# Patient Record
Sex: MALE | Race: WHITE | NOT HISPANIC OR LATINO | URBAN - METROPOLITAN AREA
[De-identification: names, ages, dates, MRNs, and addresses within clinical notes are randomized per-mention and may not be internally consistent; named-entity substitution may affect disease eponyms.]

---

## 2022-09-02 ENCOUNTER — INPATIENT (INPATIENT)
Facility: HOSPITAL | Age: 37
LOS: 0 days | Discharge: HOME | DRG: 310 | End: 2022-09-03
Attending: INTERNAL MEDICINE | Admitting: INTERNAL MEDICINE
Payer: COMMERCIAL

## 2022-09-02 VITALS
SYSTOLIC BLOOD PRESSURE: 128 MMHG | OXYGEN SATURATION: 100 % | TEMPERATURE: 97 F | DIASTOLIC BLOOD PRESSURE: 56 MMHG | RESPIRATION RATE: 20 BRPM | WEIGHT: 169.98 LBS | HEART RATE: 152 BPM

## 2022-09-02 LAB
ALBUMIN SERPL ELPH-MCNC: 4.6 G/DL — SIGNIFICANT CHANGE UP (ref 3.4–5)
ALP SERPL-CCNC: 68 U/L — SIGNIFICANT CHANGE UP (ref 40–120)
ALT FLD-CCNC: 23 U/L — SIGNIFICANT CHANGE UP (ref 12–42)
AMPHET UR-MCNC: NEGATIVE — SIGNIFICANT CHANGE UP
ANION GAP SERPL CALC-SCNC: 10 MMOL/L — SIGNIFICANT CHANGE UP (ref 9–16)
APPEARANCE UR: CLEAR — SIGNIFICANT CHANGE UP
AST SERPL-CCNC: 18 U/L — SIGNIFICANT CHANGE UP (ref 15–37)
BARBITURATES UR SCN-MCNC: NEGATIVE — SIGNIFICANT CHANGE UP
BASOPHILS # BLD AUTO: 0.04 K/UL — SIGNIFICANT CHANGE UP (ref 0–0.2)
BASOPHILS NFR BLD AUTO: 0.6 % — SIGNIFICANT CHANGE UP (ref 0–2)
BENZODIAZ UR-MCNC: NEGATIVE — SIGNIFICANT CHANGE UP
BILIRUB SERPL-MCNC: 0.5 MG/DL — SIGNIFICANT CHANGE UP (ref 0.2–1.2)
BILIRUB UR-MCNC: NEGATIVE — SIGNIFICANT CHANGE UP
BUN SERPL-MCNC: 18 MG/DL — SIGNIFICANT CHANGE UP (ref 7–23)
CALCIUM SERPL-MCNC: 9.3 MG/DL — SIGNIFICANT CHANGE UP (ref 8.5–10.5)
CHLORIDE SERPL-SCNC: 103 MMOL/L — SIGNIFICANT CHANGE UP (ref 96–108)
CO2 SERPL-SCNC: 29 MMOL/L — SIGNIFICANT CHANGE UP (ref 22–31)
COCAINE METAB.OTHER UR-MCNC: NEGATIVE — SIGNIFICANT CHANGE UP
COLOR SPEC: YELLOW — SIGNIFICANT CHANGE UP
CREAT SERPL-MCNC: 1.08 MG/DL — SIGNIFICANT CHANGE UP (ref 0.5–1.3)
DIFF PNL FLD: NEGATIVE — SIGNIFICANT CHANGE UP
EGFR: 90 ML/MIN/1.73M2 — SIGNIFICANT CHANGE UP
EOSINOPHIL # BLD AUTO: 0.01 K/UL — SIGNIFICANT CHANGE UP (ref 0–0.5)
EOSINOPHIL NFR BLD AUTO: 0.1 % — SIGNIFICANT CHANGE UP (ref 0–6)
FLUAV AG NPH QL: SIGNIFICANT CHANGE UP
FLUBV AG NPH QL: SIGNIFICANT CHANGE UP
GLUCOSE SERPL-MCNC: 121 MG/DL — HIGH (ref 70–99)
GLUCOSE UR QL: NEGATIVE — SIGNIFICANT CHANGE UP
HCT VFR BLD CALC: 48 % — SIGNIFICANT CHANGE UP (ref 39–50)
HGB BLD-MCNC: 16.1 G/DL — SIGNIFICANT CHANGE UP (ref 13–17)
IMM GRANULOCYTES NFR BLD AUTO: 0.3 % — SIGNIFICANT CHANGE UP (ref 0–1.5)
KETONES UR-MCNC: ABNORMAL MG/DL
LEUKOCYTE ESTERASE UR-ACNC: NEGATIVE — SIGNIFICANT CHANGE UP
LYMPHOCYTES # BLD AUTO: 1.76 K/UL — SIGNIFICANT CHANGE UP (ref 1–3.3)
LYMPHOCYTES # BLD AUTO: 24.5 % — SIGNIFICANT CHANGE UP (ref 13–44)
MAGNESIUM SERPL-MCNC: 1.8 MG/DL — SIGNIFICANT CHANGE UP (ref 1.6–2.6)
MCHC RBC-ENTMCNC: 29.9 PG — SIGNIFICANT CHANGE UP (ref 27–34)
MCHC RBC-ENTMCNC: 33.5 GM/DL — SIGNIFICANT CHANGE UP (ref 32–36)
MCV RBC AUTO: 89.1 FL — SIGNIFICANT CHANGE UP (ref 80–100)
METHADONE UR-MCNC: NEGATIVE — SIGNIFICANT CHANGE UP
MONOCYTES # BLD AUTO: 0.44 K/UL — SIGNIFICANT CHANGE UP (ref 0–0.9)
MONOCYTES NFR BLD AUTO: 6.1 % — SIGNIFICANT CHANGE UP (ref 2–14)
NEUTROPHILS # BLD AUTO: 4.9 K/UL — SIGNIFICANT CHANGE UP (ref 1.8–7.4)
NEUTROPHILS NFR BLD AUTO: 68.4 % — SIGNIFICANT CHANGE UP (ref 43–77)
NITRITE UR-MCNC: NEGATIVE — SIGNIFICANT CHANGE UP
NRBC # BLD: 0 /100 WBCS — SIGNIFICANT CHANGE UP (ref 0–0)
NT-PROBNP SERPL-SCNC: 15 PG/ML — SIGNIFICANT CHANGE UP
OPIATES UR-MCNC: NEGATIVE — SIGNIFICANT CHANGE UP
PCP SPEC-MCNC: SIGNIFICANT CHANGE UP
PCP UR-MCNC: NEGATIVE — SIGNIFICANT CHANGE UP
PH UR: 6 — SIGNIFICANT CHANGE UP (ref 5–8)
PLATELET # BLD AUTO: 217 K/UL — SIGNIFICANT CHANGE UP (ref 150–400)
POTASSIUM SERPL-MCNC: 4 MMOL/L — SIGNIFICANT CHANGE UP (ref 3.5–5.3)
POTASSIUM SERPL-SCNC: 4 MMOL/L — SIGNIFICANT CHANGE UP (ref 3.5–5.3)
PROT SERPL-MCNC: 7.7 G/DL — SIGNIFICANT CHANGE UP (ref 6.4–8.2)
PROT UR-MCNC: NEGATIVE MG/DL — SIGNIFICANT CHANGE UP
RBC # BLD: 5.39 M/UL — SIGNIFICANT CHANGE UP (ref 4.2–5.8)
RBC # FLD: 12.7 % — SIGNIFICANT CHANGE UP (ref 10.3–14.5)
RSV RNA NPH QL NAA+NON-PROBE: SIGNIFICANT CHANGE UP
SARS-COV-2 RNA SPEC QL NAA+PROBE: SIGNIFICANT CHANGE UP
SODIUM SERPL-SCNC: 142 MMOL/L — SIGNIFICANT CHANGE UP (ref 132–145)
SP GR SPEC: 1.02 — SIGNIFICANT CHANGE UP (ref 1–1.03)
THC UR QL: NEGATIVE — SIGNIFICANT CHANGE UP
TROPONIN I, HIGH SENSITIVITY RESULT: 5.8 NG/L — SIGNIFICANT CHANGE UP
UROBILINOGEN FLD QL: 1 E.U./DL — SIGNIFICANT CHANGE UP
WBC # BLD: 7.17 K/UL — SIGNIFICANT CHANGE UP (ref 3.8–10.5)
WBC # FLD AUTO: 7.17 K/UL — SIGNIFICANT CHANGE UP (ref 3.8–10.5)

## 2022-09-02 PROCEDURE — 71046 X-RAY EXAM CHEST 2 VIEWS: CPT | Mod: 26

## 2022-09-02 PROCEDURE — 99285 EMERGENCY DEPT VISIT HI MDM: CPT | Mod: 25

## 2022-09-02 PROCEDURE — 93010 ELECTROCARDIOGRAM REPORT: CPT

## 2022-09-02 RX ORDER — SODIUM CHLORIDE 9 MG/ML
1000 INJECTION INTRAMUSCULAR; INTRAVENOUS; SUBCUTANEOUS ONCE
Refills: 0 | Status: COMPLETED | OUTPATIENT
Start: 2022-09-02 | End: 2022-09-02

## 2022-09-02 RX ORDER — INFLUENZA VIRUS VACCINE 15; 15; 15; 15 UG/.5ML; UG/.5ML; UG/.5ML; UG/.5ML
0.5 SUSPENSION INTRAMUSCULAR ONCE
Refills: 0 | Status: DISCONTINUED | OUTPATIENT
Start: 2022-09-02 | End: 2022-09-03

## 2022-09-02 RX ADMIN — SODIUM CHLORIDE 1000 MILLILITER(S): 9 INJECTION INTRAMUSCULAR; INTRAVENOUS; SUBCUTANEOUS at 15:12

## 2022-09-02 RX ADMIN — SODIUM CHLORIDE 1000 MILLILITER(S): 9 INJECTION INTRAMUSCULAR; INTRAVENOUS; SUBCUTANEOUS at 16:55

## 2022-09-02 NOTE — ED PROVIDER NOTE - CLINICAL SUMMARY MEDICAL DECISION MAKING FREE TEXT BOX
36 yo M with no PMHx presenting with palpitations, lightheadedness, dizziness and SOB for the past hour. Reports that the 3 latter symptoms have resolved but he still feels his heart racing. Pt is also mildly anxious due to not knowing what is going on. Has had workups in the past which did not demonstrate any acute arrhythmia. However, pt has never wore a holter monitor to definitively r/o any abnormalities. Will do EKG after noting a HR of 152 in triage. Will send medical labs, chest X-ray. Will give IV fluids. During time of the exam, pt's HR had fallen to below 110. Pt states he currently feels more at baseline. Will discuss case with cardiologist. Dispo pending medical workup.

## 2022-09-02 NOTE — ED PROVIDER NOTE - ATTENDING APP SHARED VISIT CONTRIBUTION OF CARE
Paroxysmal atrial flutter noted on EKG which resolved on second EKG, admit to telemetry at [facility]

## 2022-09-02 NOTE — ED PROVIDER NOTE - OBJECTIVE STATEMENT
38 yo M with no PMHx presenting to the ER with palpitations ongoing for the past 1 hour. Pt states he did not eat breakfast but had a large cup of [employer] coffee and subsequently the symptoms began. Reports associated mild SOB, dizziness, and lightheadedness. Pt states he has had intermittent episodes of palpitation for the last 10 years which would last for a few seconds every few days and not occur again until a year later. Seen by a cardiologist before and workups including EKG showed nothing abnormal. Reports never wearing a holter monitor. Also endorses going out to drink last night and drinking more than he should have. Currently pt states the lightheadedness has improved but still feels the chest palpitations. Denies CP, abdominal pain, nausea, vomiting, fever, chills, and urinary symptoms.

## 2022-09-02 NOTE — PATIENT PROFILE ADULT - FALL HARM RISK - HARM RISK INTERVENTIONS
Assistance with ambulation/Assistance OOB with selected safe patient handling equipment/Communicate Risk of Fall with Harm to all staff/Monitor gait and stability/Reinforce activity limits and safety measures with patient and family/Sit up slowly, dangle for a short time, stand at bedside before walking/Tailored Fall Risk Interventions/Visual Cue: Yellow wristband and red socks/Bed in lowest position, wheels locked, appropriate side rails in place/Call bell, personal items and telephone in reach/Instruct patient to call for assistance before getting out of bed or chair/Non-slip footwear when patient is out of bed/Raphine to call system/Physically safe environment - no spills, clutter or unnecessary equipment/Purposeful Proactive Rounding/Room/bathroom lighting operational, light cord in reach

## 2022-09-02 NOTE — ED ADULT TRIAGE NOTE - CHIEF COMPLAINT QUOTE
Pt walked w/ SOB and heart palpitations that started when walking in the park today. Had 1 cup of starbucks coffee that isn't out of his norm.   Denies chest pain. Pmh of anxiety and has xanax for it, but pt hasn't needed to use it for over a few months now. EKG ordered.

## 2022-09-02 NOTE — ED ADULT TRIAGE NOTE - WEIGHT IN LBS
Initiate Treatment: Detail Level: Zone 169.9

## 2022-09-02 NOTE — ED ADULT NURSE NOTE - OBJECTIVE STATEMENT
Pt aox3 with steady gait. Pt states sudden onset of chest palpitations with sob and lightheadedness. [place] Pt states he drank a "fair amount last night" and did not have food today, just coffee. pt states he feel more comfortable laying down. Denies syncope. hx anxiety in the past

## 2022-09-02 NOTE — ED PROVIDER NOTE - PROGRESS NOTE DETAILS
HR improved w/ IVF and patient reverted back to sinus rhythm on rpt EKG.   D/w cards who recommends admission for PAF. Pt agreeable.

## 2022-09-03 ENCOUNTER — TRANSCRIPTION ENCOUNTER (OUTPATIENT)
Age: 37
End: 2022-09-03

## 2022-09-03 VITALS — TEMPERATURE: 98 F

## 2022-09-03 LAB
ALBUMIN SERPL ELPH-MCNC: 3.8 G/DL — SIGNIFICANT CHANGE UP (ref 3.3–5)
ALP SERPL-CCNC: 60 U/L — SIGNIFICANT CHANGE UP (ref 40–120)
ALT FLD-CCNC: 12 U/L — SIGNIFICANT CHANGE UP (ref 10–45)
ANION GAP SERPL CALC-SCNC: 6 MMOL/L — SIGNIFICANT CHANGE UP (ref 5–17)
AST SERPL-CCNC: 15 U/L — SIGNIFICANT CHANGE UP (ref 10–40)
BASOPHILS # BLD AUTO: 0.02 K/UL — SIGNIFICANT CHANGE UP (ref 0–0.2)
BASOPHILS NFR BLD AUTO: 0.3 % — SIGNIFICANT CHANGE UP (ref 0–2)
BILIRUB DIRECT SERPL-MCNC: 0.2 MG/DL — SIGNIFICANT CHANGE UP (ref 0–0.3)
BILIRUB INDIRECT FLD-MCNC: 0.3 MG/DL — SIGNIFICANT CHANGE UP (ref 0.2–1)
BILIRUB SERPL-MCNC: 0.5 MG/DL — SIGNIFICANT CHANGE UP (ref 0.2–1.2)
BUN SERPL-MCNC: 16 MG/DL — SIGNIFICANT CHANGE UP (ref 7–23)
CALCIUM SERPL-MCNC: 8.7 MG/DL — SIGNIFICANT CHANGE UP (ref 8.4–10.5)
CHLORIDE SERPL-SCNC: 109 MMOL/L — HIGH (ref 96–108)
CHOLEST SERPL-MCNC: 116 MG/DL — SIGNIFICANT CHANGE UP
CK MB CFR SERPL CALC: 1.3 NG/ML — SIGNIFICANT CHANGE UP (ref 0–6.7)
CK SERPL-CCNC: 58 U/L — SIGNIFICANT CHANGE UP (ref 30–200)
CO2 SERPL-SCNC: 25 MMOL/L — SIGNIFICANT CHANGE UP (ref 22–31)
CREAT SERPL-MCNC: 0.88 MG/DL — SIGNIFICANT CHANGE UP (ref 0.5–1.3)
EGFR: 113 ML/MIN/1.73M2 — SIGNIFICANT CHANGE UP
EOSINOPHIL # BLD AUTO: 0.05 K/UL — SIGNIFICANT CHANGE UP (ref 0–0.5)
EOSINOPHIL NFR BLD AUTO: 0.7 % — SIGNIFICANT CHANGE UP (ref 0–6)
GLUCOSE SERPL-MCNC: 93 MG/DL — SIGNIFICANT CHANGE UP (ref 70–99)
HCT VFR BLD CALC: 43.1 % — SIGNIFICANT CHANGE UP (ref 39–50)
HDLC SERPL-MCNC: 46 MG/DL — SIGNIFICANT CHANGE UP
HGB BLD-MCNC: 14.1 G/DL — SIGNIFICANT CHANGE UP (ref 13–17)
IMM GRANULOCYTES NFR BLD AUTO: 0.1 % — SIGNIFICANT CHANGE UP (ref 0–1.5)
LIPID PNL WITH DIRECT LDL SERPL: 52 MG/DL — SIGNIFICANT CHANGE UP
LYMPHOCYTES # BLD AUTO: 2.03 K/UL — SIGNIFICANT CHANGE UP (ref 1–3.3)
LYMPHOCYTES # BLD AUTO: 28.4 % — SIGNIFICANT CHANGE UP (ref 13–44)
MAGNESIUM SERPL-MCNC: 3 MG/DL — HIGH (ref 1.6–2.6)
MCHC RBC-ENTMCNC: 29.7 PG — SIGNIFICANT CHANGE UP (ref 27–34)
MCHC RBC-ENTMCNC: 32.7 GM/DL — SIGNIFICANT CHANGE UP (ref 32–36)
MCV RBC AUTO: 90.7 FL — SIGNIFICANT CHANGE UP (ref 80–100)
MONOCYTES # BLD AUTO: 0.7 K/UL — SIGNIFICANT CHANGE UP (ref 0–0.9)
MONOCYTES NFR BLD AUTO: 9.8 % — SIGNIFICANT CHANGE UP (ref 2–14)
NEUTROPHILS # BLD AUTO: 4.33 K/UL — SIGNIFICANT CHANGE UP (ref 1.8–7.4)
NEUTROPHILS NFR BLD AUTO: 60.7 % — SIGNIFICANT CHANGE UP (ref 43–77)
NON HDL CHOLESTEROL: 70 MG/DL — SIGNIFICANT CHANGE UP
NRBC # BLD: 0 /100 WBCS — SIGNIFICANT CHANGE UP (ref 0–0)
PLATELET # BLD AUTO: 180 K/UL — SIGNIFICANT CHANGE UP (ref 150–400)
POTASSIUM SERPL-MCNC: 3.8 MMOL/L — SIGNIFICANT CHANGE UP (ref 3.5–5.3)
POTASSIUM SERPL-SCNC: 3.8 MMOL/L — SIGNIFICANT CHANGE UP (ref 3.5–5.3)
PROT SERPL-MCNC: 5.9 G/DL — LOW (ref 6–8.3)
RBC # BLD: 4.75 M/UL — SIGNIFICANT CHANGE UP (ref 4.2–5.8)
RBC # FLD: 12.9 % — SIGNIFICANT CHANGE UP (ref 10.3–14.5)
SODIUM SERPL-SCNC: 140 MMOL/L — SIGNIFICANT CHANGE UP (ref 135–145)
TRIGL SERPL-MCNC: 88 MG/DL — SIGNIFICANT CHANGE UP
TROPONIN T SERPL-MCNC: 0.01 NG/ML — SIGNIFICANT CHANGE UP (ref 0–0.01)
TSH SERPL-MCNC: 1.18 UIU/ML — SIGNIFICANT CHANGE UP (ref 0.27–4.2)
WBC # BLD: 7.14 K/UL — SIGNIFICANT CHANGE UP (ref 3.8–10.5)
WBC # FLD AUTO: 7.14 K/UL — SIGNIFICANT CHANGE UP (ref 3.8–10.5)

## 2022-09-03 PROCEDURE — 99222 1ST HOSP IP/OBS MODERATE 55: CPT

## 2022-09-03 PROCEDURE — 93005 ELECTROCARDIOGRAM TRACING: CPT

## 2022-09-03 PROCEDURE — 80053 COMPREHEN METABOLIC PANEL: CPT

## 2022-09-03 PROCEDURE — 82248 BILIRUBIN DIRECT: CPT

## 2022-09-03 PROCEDURE — G0378: CPT

## 2022-09-03 PROCEDURE — 84443 ASSAY THYROID STIM HORMONE: CPT

## 2022-09-03 PROCEDURE — 87086 URINE CULTURE/COLONY COUNT: CPT

## 2022-09-03 PROCEDURE — 81003 URINALYSIS AUTO W/O SCOPE: CPT

## 2022-09-03 PROCEDURE — 85025 COMPLETE CBC W/AUTO DIFF WBC: CPT

## 2022-09-03 PROCEDURE — 36415 COLL VENOUS BLD VENIPUNCTURE: CPT

## 2022-09-03 PROCEDURE — 80307 DRUG TEST PRSMV CHEM ANLYZR: CPT

## 2022-09-03 PROCEDURE — 87637 SARSCOV2&INF A&B&RSV AMP PRB: CPT

## 2022-09-03 PROCEDURE — 99285 EMERGENCY DEPT VISIT HI MDM: CPT

## 2022-09-03 PROCEDURE — 80061 LIPID PANEL: CPT

## 2022-09-03 PROCEDURE — 82550 ASSAY OF CK (CPK): CPT

## 2022-09-03 PROCEDURE — 84484 ASSAY OF TROPONIN QUANT: CPT

## 2022-09-03 PROCEDURE — 71046 X-RAY EXAM CHEST 2 VIEWS: CPT

## 2022-09-03 PROCEDURE — 93306 TTE W/DOPPLER COMPLETE: CPT | Mod: 26

## 2022-09-03 PROCEDURE — 93306 TTE W/DOPPLER COMPLETE: CPT

## 2022-09-03 PROCEDURE — 83735 ASSAY OF MAGNESIUM: CPT

## 2022-09-03 PROCEDURE — 83880 ASSAY OF NATRIURETIC PEPTIDE: CPT

## 2022-09-03 PROCEDURE — 82553 CREATINE MB FRACTION: CPT

## 2022-09-03 RX ORDER — METOPROLOL TARTRATE 50 MG
0.5 TABLET ORAL
Qty: 4 | Refills: 0
Start: 2022-09-03 | End: 2022-09-10

## 2022-09-03 RX ORDER — ENOXAPARIN SODIUM 100 MG/ML
40 INJECTION SUBCUTANEOUS EVERY 24 HOURS
Refills: 0 | Status: DISCONTINUED | OUTPATIENT
Start: 2022-09-03 | End: 2022-09-03

## 2022-09-03 RX ORDER — MAGNESIUM OXIDE 400 MG ORAL TABLET 241.3 MG
400 TABLET ORAL ONCE
Refills: 0 | Status: COMPLETED | OUTPATIENT
Start: 2022-09-03 | End: 2022-09-03

## 2022-09-03 RX ORDER — ASPIRIN/CALCIUM CARB/MAGNESIUM 324 MG
81 TABLET ORAL DAILY
Refills: 0 | Status: DISCONTINUED | OUTPATIENT
Start: 2022-09-03 | End: 2022-09-03

## 2022-09-03 RX ADMIN — MAGNESIUM OXIDE 400 MG ORAL TABLET 400 MILLIGRAM(S): 241.3 TABLET ORAL at 01:09

## 2022-09-03 NOTE — H&P ADULT - HISTORY OF PRESENT ILLNESS
37M with PMHx anxiety (prn xanax) presented to [facility] 9/3 c/o palpitations x1hr, with associated SOB/lightheadedness. Has had workups in the past which did not demonstrate any acute arrhythmia. However, pt has never wore a holter monitor to definitively r/o any abnormalities. Reports SOB/lightheadness now resolved. Still c/o palpitations. Of note, patient reports recreational drug and ETOH use (approx 7 drinks/week), non smoker.     [facility] ED: /56, HR 152bpm, SpO2 100% on RA. EKG: Aflutter , which patient later self converted HR 90s. s/p 2L IVF. CXR clear. Labs notable for: WBC 7, H/H 16/48, Cr 1.04, Mg 1.8, TropI 5.8. RPP/UA/Utox all negative.     Decision made to transfer to [facility] for further evaluation and management of new onset paroxysmal atrial fibrillation

## 2022-09-03 NOTE — H&P ADULT - NSHPPHYSICALEXAM_GEN_ALL_CORE
Gen: NAD, pleasant and conversant   Neuro: AOx3, nonfocal   HEENT: PERRLA, EOMI, normal oral mucosa  CV: RRR, +S1S2  Pulm: CTAB   GI: +BS, soft, NT/ND   Extremities: WWP, no edema   Skin: No rashes or trauma noted

## 2022-09-03 NOTE — DISCHARGE NOTE NURSING/CASE MANAGEMENT/SOCIAL WORK - NSDCPEFALRISK_GEN_ALL_CORE
For information on Fall & Injury Prevention, visit: [email or web address] OR  [email or web address] OR  https://www.cdc.gov/steadi/patient.html

## 2022-09-03 NOTE — DISCHARGE NOTE PROVIDER - NSDCCPCAREPLAN_GEN_ALL_CORE_FT
PRINCIPAL DISCHARGE DIAGNOSIS  Diagnosis: Paroxysmal atrial flutter  Assessment and Plan of Treatment: You were found to be in atrial flutter (a type of irregular heartbeat) here in the hospital. People with atrial flutter are at an increased risk of forming a blood clot in the heart, which can travel to the brain, causing a stroke, or to other parts of the body. Your heart rate went back to normal after you received fluids through the IV. Given your age and no significant medical history, you were not placed on an anticoagulant medication. Additionally it is important to make sure your heart rate stays controlled, you have been started on _____ to help control your heart rate. Please follow-up with your cardiologist in 1-2 weeks for further managment.       PRINCIPAL DISCHARGE DIAGNOSIS  Diagnosis: Paroxysmal atrial flutter  Assessment and Plan of Treatment: You were found to be in atrial flutter (a type of irregular heartbeat) here in the hospital. People with atrial flutter are at an increased risk of forming a blood clot in the heart, which can travel to the brain, causing a stroke, or to other parts of the body. Your heart rate went back to normal after you received fluids through the IV. Given your age and no significant medical history, you were not placed on an anticoagulant medication. Your heart rate/rhythm has been normal so you were not started on medication. It is recommended that you speak to your cardiologist about wearing a holter monitor to further monitor your heart rate. Please follow-up with your cardiologist in 1-2 weeks for further management.      SECONDARY DISCHARGE DIAGNOSES  Diagnosis: Anxiety  Assessment and Plan of Treatment: You have known anxiety. Please take your Xanax as needed and follow up with your primary care provider within 1-2 weeks of discharge for further management.     PRINCIPAL DISCHARGE DIAGNOSIS  Diagnosis: Paroxysmal atrial flutter  Assessment and Plan of Treatment: You were found to be in atrial flutter (a type of irregular heartbeat) here in the hospital. People with atrial flutter are at an increased risk of forming a blood clot in the heart, which can travel to the brain, causing a stroke, or to other parts of the body. Your heart rate went back to normal after you received fluids through the IV. Given your age and no significant medical history, you were not placed on an anticoagulant medication. Your heart rate/rhythm has been normal so you were not started on medication. Your echocardiogram (ultrasound of your heart) was normal. It is recommended that you speak to your cardiologist about wearing a holter monitor to further monitor your heart rate. Please follow-up with your cardiologist in 1-2 weeks for further management.      SECONDARY DISCHARGE DIAGNOSES  Diagnosis: Anxiety  Assessment and Plan of Treatment: You have known anxiety. Please take your Xanax as needed and follow up with your primary care provider within 1-2 weeks of discharge for further management.     PRINCIPAL DISCHARGE DIAGNOSIS  Diagnosis: Paroxysmal atrial flutter  Assessment and Plan of Treatment: You were found to be in atrial flutter (a type of irregular heartbeat) here in the hospital. People with atrial flutter are at an increased risk of forming a blood clot in the heart, which can travel to the brain, causing a stroke, or to other parts of the body. Your heart rate went back to normal after you received fluids through the IV. Given your age and no significant medical history, you were not placed on an anticoagulant medication. Your heart rate/rhythm has been normal so you were not started on medication. However, if you experience palpitations please take Lopressor 25mg every 6 hours as needed. Your echocardiogram (ultrasound of your heart) was normal. It is recommended that you speak to your cardiologist about wearing a holter monitor to further monitor your heart rate. Please follow-up with your cardiologist in 1-2 weeks for further management.      SECONDARY DISCHARGE DIAGNOSES  Diagnosis: Anxiety  Assessment and Plan of Treatment: You have known anxiety. Please take your Xanax as needed and follow up with your primary care provider within 1-2 weeks of discharge for further management.

## 2022-09-03 NOTE — DISCHARGE NOTE PROVIDER - HOSPITAL COURSE
36 y/o Male w/ PMHx of Anxiety (on PRN Anxiety) [facility]/o palpitations a/w SOB/lightheadedness. EKG revealed AFlutter w/ HR 150s. Patient given IVF and self converted to NSR. Patient transferred to [facility] for further management of new onset pAflutter. EKG at [facility] NSR. Of note, patient states he had similar episodes in the past w/ prior workup w/ outpatient cardiologist revealing no acute arrhythmia however has never wore a holter monitor. CHADSVASc 0, so patient was not started on AC. Was started on ASA 81 QD but no initiation of BB given NSR. Cardiology consulted, recommending ______. TTE revealed ____    Patient seen and examined at the bedside on ______. No significant events on telemetry overnight. AM labs wnl, VSS/HD stable. Denies any complaints at this time. Patient has been medically cleared for discharge as per  ______. Patient has been given appropriate discharge instructions including medication regimen, access site management and follow up. Medications that patient needs refills on (+/- new medications) have been e-prescribed to preferred pharmacy. Patient will f/u with  ______ in 1-2 weeks for further management.     Temp HR BP SpO2 RR; VSS  Gen: NAD, A&O x3  Cards: RRR, clear S1 and S2 without murmur  Pulm: CTA B/L without w/r/r  Vascular: Peripheral pulses 2+ B/L  Abd: soft, NT  Ext: no LE edema or ulcerations B/L       Attending Attestation (For Attendings USE Only)... 36 y/o Male w/ PMHx of Anxiety (on PRN Anxiety) [facility]/o palpitations a/w SOB/lightheadedness. EKG revealed AFlutter w/ HR 150s. Patient given IVF and self converted to NSR. Patient transferred to [facility] for further management of new onset pAflutter. EKG at [facility] NSR. Of note, patient states he had similar episodes in the past w/ prior workup w/ outpatient cardiologist revealing no acute arrhythmia however has never wore a holter monitor. CHADSVASc 0, so patient was not started on AC. Was started on ASA 81 QD but no initiation of BB given NSR. TTE revealed ____    Patient seen and examined at the bedside on 9/3/22. No significant events on telemetry overnight. AM labs wnl, VSS/HD stable. Denies any complaints at this time. Patient has been medically cleared for discharge as per Dr. Stiles. Patient has been given appropriate discharge instructions including medication regimen, access site management and follow up. Medications that patient needs refills on (+/- new medications) have been e-prescribed to preferred pharmacy. Patient will f/u with Dr. Holley in 1-2 weeks for further management.     Temp HR BP SpO2 RR; VSS  Gen: NAD, A&O x3  Cards: RRR, clear S1 and S2 without murmur  Pulm: CTA B/L without w/r/r  Vascular: Peripheral pulses 2+ B/L  Abd: soft, NT  Ext: no LE edema or ulcerations B/L 38 y/o Male w/ PMHx of Anxiety (on PRN Anxiety) [facility]/o palpitations a/w SOB/lightheadedness. EKG revealed AFlutter w/ HR 150s. Patient given IVF and self converted to NSR. Patient transferred to [facility] for further management of new onset pAflutter. EKG at [facility] NSR. Of note, patient states he had similar episodes in the past w/ prior workup w/ outpatient cardiologist revealing no acute arrhythmia however has never wore a holter monitor. CHADSVASc 0, so patient was not started on AC. Was started on ASA 81 QD but no initiation of BB given NSR. TTE 9/3: revealed LVEF 65%, nl LV/RVSF, no significant valvular disease, no pHTN (PASP 24), no pericardial effusion    Patient seen and examined at the bedside on 9/3/22. No significant events on telemetry overnight. AM labs wnl, VSS/HD stable. Denies any complaints at this time. Patient has been medically cleared for discharge as per Dr. Stiles. Patient has been given appropriate discharge instructions including medication regimen, access site management and follow up. Medications that patient needs refills on (+/- new medications) have been e-prescribed to preferred pharmacy. Patient will f/u with Dr. Stiles and Dr. Holley in 1-2 weeks for further management.     Temp HR BP SpO2 RR; VSS  Gen: NAD, A&O x3  Cards: RRR, clear S1 and S2 without murmur  Pulm: CTA B/L without w/r/r  Vascular: Peripheral pulses 2+ B/L  Abd: soft, NT  Ext: no LE edema or ulcerations B/L

## 2022-09-03 NOTE — DISCHARGE NOTE PROVIDER - NSDCFUADDAPPT_GEN_ALL_CORE_FT
Please follow up with your cardiologist within 1-2 weeks of discharge Please follow up with your primary care provider within 1-2 weeks of discharge  Please follow up with a cardiologist within 1-2 weeks of discharge. Please call Dr. Stiles's office to make an appointment.

## 2022-09-03 NOTE — DISCHARGE NOTE NURSING/CASE MANAGEMENT/SOCIAL WORK - PATIENT PORTAL LINK FT
You can access the FollowMyHealth Patient Portal offered by [facility] by registering at the following website: [email or web address]. By joining [facility]’s FollowMyHealth portal, you will also be able to view your health information using other applications (apps) compatible with our system.

## 2022-09-03 NOTE — DISCHARGE NOTE PROVIDER - CARE PROVIDERS DIRECT ADDRESSES
,DirectAddress_Unknown ,DirectAddress_Unknown,[email or web address]

## 2022-09-03 NOTE — H&P ADULT - NSHPLABSRESULTS_GEN_ALL_CORE
16.1   7.17  )-----------( 217      ( 02 Sep 2022 15:04 )             48.0       09-02    142  |  103  |  18  ----------------------------<  121<H>  4.0   |  29  |  1.08    Ca    9.3      02 Sep 2022 15:04  Mg     1.8         TPro  7.7  /  Alb  4.6  /  TBili  0.5  /  DBili  x   /  AST  18  /  ALT  23  /  AlkPhos  68  09-02        Urinalysis Basic - ( 02 Sep 2022 15:04 )    Color: Yellow / Appearance: Clear / S.020 / pH: x  Gluc: x / Ketone: Trace mg/dL  / Bili: NEGATIVE / Urobili: 1.0 E.U./dL   Blood: x / Protein: NEGATIVE mg/dL / Nitrite: NEGATIVE   Leuk Esterase: NEGATIVE / RBC: x / WBC x   Sq Epi: x / Non Sq Epi: x / Bacteria: x        EKG:

## 2022-09-03 NOTE — DISCHARGE NOTE PROVIDER - CARE PROVIDER_API CALL
JIMBO TOLBERT  Internal Medicine  [address] [place] [address]  Phone: [phone]  Fax: [phone]  Established Patient  Follow Up Time: 2 weeks   JIMBO TOLBERT  Internal Medicine  [address] [place] [address]  Phone: [phone]  Fax: [phone]  Established Patient  Follow Up Time: 2 weeks    Suman Stiles)  Cardiology  [address] [place] [address]  Phone: [phone]  Fax: [phone]  Established Patient  Follow Up Time: 2 weeks

## 2022-09-03 NOTE — DISCHARGE NOTE PROVIDER - PROVIDER TOKENS
PROVIDER:[TOKEN:[[id number]],FOLLOWUP:[2 weeks],ESTABLISHEDPATIENT:[T]] PROVIDER:[TOKEN:[[id number]],FOLLOWUP:[2 weeks],ESTABLISHEDPATIENT:[T]],PROVIDER:[TOKEN:[[id number]],FOLLOWUP:[2 weeks],ESTABLISHEDPATIENT:[T]]

## 2022-09-03 NOTE — H&P ADULT - ASSESSMENT
37M with PMHx anxiety (prn xanax) presented to [facility] 9/3 c/o palpitations c/w Aflutter HR 150s bpm, patient later self converted NSR. Transferred to [facility] for further management of new onset pAflutter.      Problem/Plan - 1   ·  Problem: new onset paroxysmal atrial flutter    ·  Plan: p/w palpitations/ SOB/lightheadness x1 hr. patient states he has had similar episodes in the past, workups with cardiologist did not demonstrate any acute arrhythmia. however, pt has never wore a holter monitor. while at [facility], patient self converted. Currently NSR 80s.   - OSH EKG: paroxysmal atrial flutter, HR 150bpm   - [facility] EKG: NSR    - Closely follow electrolytes and TSH   - CHADSVASC 0 = NO AC   - started ASA 81 daily   - did not start BB given patient currently in NSR, please discuss with cards tmrw        Mg<2, K <4   Dash Diet   Lovenox   Dispo: pending rhythm control, hopefully tomorrow      37M with PMHx anxiety (prn xanax) presented to [facility] 9/3 c/o palpitations c/w Aflutter HR 150s bpm, patient later self converted NSR. Transferred to [facility] for further management of new onset pAflutter.      Problem/Plan - 1   ·  Problem: new onset paroxysmal atrial flutter    ·  Plan: p/w palpitations/ SOB/lightheadness x1 hr. patient states he has had similar episodes in the past, workups with cardiologist did not demonstrate any acute arrhythmia. however, pt has never wore a holter monitor. while at [facility], patient self converted. Currently NSR 80s.   - OSH EKG: paroxysmal atrial flutter, HR 150bpm   - [facility] EKG: NSR    - Closely follow electrolytes and TSH   - CHADSVASC 0 = NO AC   - started ASA 81 daily   - did not start BB given patient currently in NSR, please discuss with cards tmrw        Mg<2, K <4   Dash Diet   Lovenox   Dispo: pending rhythm control, hopefully tomorrow

## 2022-09-04 LAB
CULTURE RESULTS: SIGNIFICANT CHANGE UP
SPECIMEN SOURCE: SIGNIFICANT CHANGE UP

## 2022-09-07 PROBLEM — F41.9 ANXIETY DISORDER, UNSPECIFIED: Chronic | Status: ACTIVE | Noted: 2022-09-03

## 2022-09-08 DIAGNOSIS — R00.2 PALPITATIONS: ICD-10-CM

## 2022-09-08 DIAGNOSIS — I48.92 UNSPECIFIED ATRIAL FLUTTER: ICD-10-CM

## 2022-09-08 DIAGNOSIS — F41.9 ANXIETY DISORDER, UNSPECIFIED: ICD-10-CM

## 2022-09-22 PROBLEM — Z00.00 ENCOUNTER FOR PREVENTIVE HEALTH EXAMINATION: Status: ACTIVE | Noted: 2022-09-22

## 2022-09-23 ENCOUNTER — APPOINTMENT (OUTPATIENT)
Dept: HEART AND VASCULAR | Facility: CLINIC | Age: 37
End: 2022-09-23

## 2022-09-23 VITALS
BODY MASS INDEX: 26.07 KG/M2 | TEMPERATURE: 98.3 F | WEIGHT: 176 LBS | SYSTOLIC BLOOD PRESSURE: 127 MMHG | OXYGEN SATURATION: 98 % | HEART RATE: 71 BPM | DIASTOLIC BLOOD PRESSURE: 73 MMHG | HEIGHT: 69 IN

## 2022-09-23 DIAGNOSIS — Z92.89 PERSONAL HISTORY OF OTHER MEDICAL TREATMENT: ICD-10-CM

## 2022-09-23 DIAGNOSIS — Z78.9 OTHER SPECIFIED HEALTH STATUS: ICD-10-CM

## 2022-09-23 PROCEDURE — 99214 OFFICE O/P EST MOD 30 MIN: CPT | Mod: 25

## 2022-09-23 PROCEDURE — 93000 ELECTROCARDIOGRAM COMPLETE: CPT

## 2022-11-04 ENCOUNTER — APPOINTMENT (OUTPATIENT)
Dept: HEART AND VASCULAR | Facility: CLINIC | Age: 37
End: 2022-11-04

## 2022-11-04 DIAGNOSIS — Z86.59 PERSONAL HISTORY OF OTHER MENTAL AND BEHAVIORAL DISORDERS: ICD-10-CM

## 2022-11-04 DIAGNOSIS — I47.29 OTHER VENTRICULAR TACHYCARDIA: ICD-10-CM

## 2022-11-04 DIAGNOSIS — I48.92 UNSPECIFIED ATRIAL FLUTTER: ICD-10-CM

## 2022-11-04 PROCEDURE — 99442: CPT

## 2022-11-04 RX ORDER — METOPROLOL SUCCINATE 25 MG/1
25 TABLET, EXTENDED RELEASE ORAL
Qty: 90 | Refills: 1 | Status: ACTIVE | COMMUNITY
Start: 2022-11-04 | End: 1900-01-01

## 2022-11-07 ENCOUNTER — TRANSCRIPTION ENCOUNTER (OUTPATIENT)
Age: 37
End: 2022-11-07

## 2022-11-22 PROBLEM — I48.92 ATRIAL FLUTTER: Status: ACTIVE | Noted: 2022-09-23

## 2022-11-22 PROBLEM — Z86.59 HISTORY OF ANXIETY: Status: RESOLVED | Noted: 2022-11-22 | Resolved: 2022-11-22

## 2022-11-22 PROBLEM — Z78.9 NON-SMOKER: Status: ACTIVE | Noted: 2022-11-22

## 2022-11-22 NOTE — HISTORY OF PRESENT ILLNESS
[FreeTextEntry1] : 37M w/ anxiety recently admitted to [facility] with newly diagnosed atrial flutter on 9/2/22. Pt self-converted spontaneously during hospitalization. TTE performed 9/3/22 wnl. given [id number] score of 0, pt was dc'd on no ASA/AC. PT states since his hospitalization he can not ascertain if he has gone in to atrial flutter again as he feels his panic attacks are similar to his sx of flutter. Denies excessive caffeine use, no illicit drug use. No recent wt loss or gain. Returns today via a TH visit for results of his Event monitor. No new complaints. \par \par ECG: NSR at 69 bpm, nl axis \par \par

## 2022-11-22 NOTE — HISTORY OF PRESENT ILLNESS
[FreeTextEntry1] : 37M w/ anxiety recently admitted to [facility] with newly diagnosed atrial flutter on 9/2/22. Pt self-converted spontaneously during hospitalization. TTE performed 9/3/22 wnl. given [id number] score of 0, pt was dc'd on no ASA/AC. PT states since his hospitalization he can not ascertain if he has gone in to atrial flutter again as he feels his panic attacks are similar to his sx of flutter. Denies excessive caffeine use, no illicit drug use. No recent wt loss or gain. \par \par ECG: NSR at 69 bpm, nl axis \par \par

## 2022-11-22 NOTE — ASSESSMENT
[FreeTextEntry1] : 1. Atrial flutter\par - in NSR now\par - [id number] score 0\par - given metoprolol prn ( pill in pocket)\par -  Event Monitor reviewed, 6 beat run of NSVT noted, no episodes of afib/flutter\par - results discussed in detail with pt \par - start toprol 25 qd instead\par - check CCTA to r/o possible congenital etiology \par - all hospital reports reviewed\par - 9/3/22 TTE wnl\par - results discussed in detail with pt \par - further recommendations pending results \par \par \par RTC in 3 weeks via TH visit \par Spent 11 minutes on telehealth/phone visit, all questions answered in detail.

## 2022-11-22 NOTE — ASSESSMENT
[FreeTextEntry1] : 1. Atrial flutter\par - in NSR now\par - [id number] score 0\par - metoprolol prn ( pill in pocket)\par - check Event Monitor \par - all hospital reports reviewed\par - 9/3/22 TTE wnl\par - results discussed in detail with pt \par - further recommendations pending results \par \par \par RTC in 3 weeks via TH visit
